# Patient Record
Sex: FEMALE | URBAN - METROPOLITAN AREA
[De-identification: names, ages, dates, MRNs, and addresses within clinical notes are randomized per-mention and may not be internally consistent; named-entity substitution may affect disease eponyms.]

---

## 2023-05-19 ENCOUNTER — APPOINTMENT (RX ONLY)
Dept: URBAN - METROPOLITAN AREA CLINIC 154 | Facility: CLINIC | Age: 58
Setting detail: DERMATOLOGY
End: 2023-05-19

## 2023-05-19 DIAGNOSIS — Z41.9 ENCOUNTER FOR PROCEDURE FOR PURPOSES OTHER THAN REMEDYING HEALTH STATE, UNSPECIFIED: ICD-10-CM

## 2023-05-19 PROCEDURE — ? FILLERS

## 2023-05-19 PROCEDURE — ? XEOMIN

## 2023-05-19 NOTE — PROCEDURE: XEOMIN
Show Orbicularis Oculi Units: Yes
[facility] Units: 0
Show Right And Left Periorbital Units: No
Expiration Date (Month Year): 06/22
Forehead Units: 10
Additional Area 1 Location: [facility]
Glabellar Complex Units: 15
Detail Level: Detailed
Consent: Written consent obtained. Risks include but not limited to lid/brow ptosis, bruising, swelling, diplopia, temporary effect, incomplete chemical denervation.
Lot #: [id number]
Post-Care Instructions: Patient instructed to not lie down for 4 hours and limit physical activity for 24 hours.
Dilution (U/0.1 Cc): 4

## 2023-05-19 NOTE — PROCEDURE: FILLERS
Anesthesia Type: 1% lidocaine with epinephrine
Lot #: [id number]
Dorsal Hands Filler  Volume In Cc: 0
Additional Area 1 Location: perioral lines
Expiration Date (Month Year): 06/23
Map Statment: See Attach Map for Complete Details
Include Cannula Information In Note?: No
Expiration Date (Month Year): 08/24
Consent: Written consent obtained. Risks include but not limited to bruising, beading, irregular texture, ulceration, infection, allergic reaction, scar formation, incomplete augmentation, temporary nature, procedural pain.
Post-Care Instructions: Patient instructed to apply ice to reduce swelling.
Additional Anesthesia Volume In Cc: 6
Additional Area 1 Location: Chin
Aspiration Statement: Aspiration was performed prior to injecting site with filler.
Dorsal Hands Filler  Volume In Cc: 2
Additional Area 2 Location: smile lines
Lot #: [id number]
Filler: Carlos Phillips
Expiration Date (Month Year): 08/24/23
Detail Level: Detailed
Lot #: [id number]